# Patient Record
Sex: MALE | Race: WHITE | URBAN - METROPOLITAN AREA
[De-identification: names, ages, dates, MRNs, and addresses within clinical notes are randomized per-mention and may not be internally consistent; named-entity substitution may affect disease eponyms.]

---

## 2018-04-01 ENCOUNTER — TRANSFERRED RECORDS (OUTPATIENT)
Dept: HEALTH INFORMATION MANAGEMENT | Facility: CLINIC | Age: 53
End: 2018-04-01

## 2018-05-02 ENCOUNTER — RADIANT APPOINTMENT (OUTPATIENT)
Dept: GENERAL RADIOLOGY | Facility: CLINIC | Age: 53
End: 2018-05-02
Attending: FAMILY MEDICINE
Payer: COMMERCIAL

## 2018-05-02 ENCOUNTER — OFFICE VISIT (OUTPATIENT)
Dept: FAMILY MEDICINE | Facility: CLINIC | Age: 53
End: 2018-05-02
Payer: COMMERCIAL

## 2018-05-02 VITALS
RESPIRATION RATE: 12 BRPM | DIASTOLIC BLOOD PRESSURE: 74 MMHG | HEART RATE: 71 BPM | SYSTOLIC BLOOD PRESSURE: 117 MMHG | TEMPERATURE: 97.4 F | BODY MASS INDEX: 27.3 KG/M2 | WEIGHT: 201.6 LBS | HEIGHT: 72 IN

## 2018-05-02 DIAGNOSIS — S69.92XA WRIST INJURY, LEFT, INITIAL ENCOUNTER: ICD-10-CM

## 2018-05-02 DIAGNOSIS — S52.502A CLOSED FRACTURE OF DISTAL END OF LEFT RADIUS, UNSPECIFIED FRACTURE MORPHOLOGY, INITIAL ENCOUNTER: Primary | ICD-10-CM

## 2018-05-02 PROCEDURE — 99214 OFFICE O/P EST MOD 30 MIN: CPT | Mod: 25 | Performed by: FAMILY MEDICINE

## 2018-05-02 PROCEDURE — 73110 X-RAY EXAM OF WRIST: CPT | Mod: LT

## 2018-05-02 PROCEDURE — 29125 APPL SHORT ARM SPLINT STATIC: CPT | Performed by: FAMILY MEDICINE

## 2018-05-02 NOTE — PROGRESS NOTES
SUBJECTIVE:   Nikita Troy is a 52 year old male who presents to clinic today for the following health issues:      Joint Pain    Onset: injured left wrist yesterday, fell yesterday after the bench (1 foot high) he was standing on collapsed and caught the fall with both upper extremities but right UE had a wrist brace. Patient said he fell forward like he was doing a push up.    Description:   Location: left wrist  Character: Dull ache    Intensity: 10/10 if he tries to use the hand/wrist    Progression of Symptoms: same    Accompanying Signs & Symptoms:  Other symptoms: radiation of pain to lower arm, numbness and swelling, can't lift due to the pain  Patient denies head trauma or LOC during the incident.    History:   Previous similar pain: no       Precipitating factors:   Trauma or overuse: YES- fell yesterday    Alleviating factors:  Improved by: ice    Therapies Tried and outcome: ice, aleve - no relief to date. Patient denies going to UC or ER at time of incident.     Verified above history with patient.      Problem list and histories reviewed & adjusted, as indicated.  Additional history: as documented    There is no problem list on file for this patient.    History reviewed. No pertinent surgical history.    Social History   Substance Use Topics     Smoking status: Former Smoker     Smokeless tobacco: Former User     Types: Chew     Alcohol use Yes      Comment: few beers weekly     Family History   Problem Relation Age of Onset     CEREBROVASCULAR DISEASE Mother          Current Outpatient Prescriptions   Medication Sig Dispense Refill     METHOTREXATE PO 6 tablet weekly       Naproxen Sodium (ALEVE PO) prn       No Known Allergies    Reviewed and updated as needed this visit by clinical staff  Tobacco  Allergies  Meds  Problems  Med Hx  Surg Hx  Fam Hx  Soc Hx        Reviewed and updated as needed this visit by Provider  Allergies  Meds  Problems         ROS:  C: NEGATIVE for fever, chills,  "change in weight  I: NEGATIVE for worrisome rashes, moles or lesions  MUSCULOSKELETAL:see above  N: NEGATIVE for weakness, dizziness or paresthesias  H: NEGATIVE for bleeding problems    OBJECTIVE:                                                    /74 (BP Location: Right arm, Patient Position: Chair, Cuff Size: Adult Large)  Pulse 71  Temp 97.4  F (36.3  C) (Tympanic)  Resp 12  Ht 5' 11.5\" (1.816 m)  Wt 201 lb 9.6 oz (91.4 kg)  BMI 27.73 kg/m2  Body mass index is 27.73 kg/(m^2).  GENERAL: alert and no distress  Wrist Exam: Left WRIST:  Inspection: swelling moderate; skin: non-ecchymotic, non-erythematous  Palpation: Tender: distal radius  Non-tender: distal ulna, TFCC, ECU, 1st dorsal compartment, extension tendons, flexor tendons, snuff box  Range of Motion: difficultly making fist; limited flexion/extension due to pain and swelling  Strength:  strength decreased.   Wrist strength unable to be tested due to pain  Special tests: radial, ulnar and median nerve function intact    ELBOW:  Ipsilateral elbow with no swelling/TTP      Diagnostic test results:  Diagnostic Test Results:  Results for orders placed or performed in visit on 05/02/18 (from the past 24 hour(s))   XR Wrist Left G/E 3 Views    Narrative    LEFT WRIST THREE OR MORE VIEWS  5/2/2018 7:46 AM     HISTORY: Rule out fracture. Wrist injury, left, initial encounter.      Impression    IMPRESSION: Distal radial fracture with a probable transverse,  hairline, nondisplaced component and a longitudinal component adjacent  to the distal radioulnar joint. Distal radioulnar joint degenerative  arthrosis is noted with ulnar tuberosity osteophytic spurring. The  radiocarpal joint space width appears within normal limits. Mild  widening of the scapholunate joint space width could indicate  age-indeterminate scapholunate ligament injury.    REID YEBOAH MD        ASSESSMENT/PLAN:                                                        ICD-10-CM  "   1. Closed fracture of distal end of left radius, unspecified fracture morphology, initial encounter S52.502A ORTHO  REFERRAL     FIBERGLASS  SPLINT ( ONE STEP)   2. Wrist injury, left, initial encounter S69.92XA XR Wrist Left G/E 3 Views     Patient was advised on xray images. Discussed presence of visible distal radial fracture. Radiology report pending at time of encounter.  RICE tx discussed with patient.  Ortho consult ordered.  Ibuprofen 200 mg 1-2 tablets with food every 8 hrs as needed for pain  Applied fiberglass splint extending to MCP and to middle third of forearm for immobilization.  Patient was advised to keep splint until he sees ortho. Adjust elastic wrap as swelling increases or decreases.  Advised to avoid use of the left hand until healed.  Return precautions discussed and given to patient.      Follow up with Provider - at ortho appt   Patient Instructions     You will be contacted in 1-2 business days to get a schedule for the orthopedic specialist    Keep splint on until that appointment.    Ibuprofen 200 mg 1-2 tablets with food every 8 hrs as needed for pain.        Thank you for choosing [facility].  You may be receiving a survey in the mail from [employer] regarding your visit today.  Please take a few minutes to complete and return the survey to let us know how we are doing.      If you have questions or concerns, please contact us via [employer] or you can contact your care team at [phone].    Our Clinic hours are:  Monday 6:40 am  to 7:00 pm  Tuesday -Friday 6:40 am to 5:00 pm    The [place] outpatient lab hours are:  Monday - Friday 6:10 am to 4:45 pm  Saturdays 7:00 am to 11:00 am  Appointments are required, call [phone]    If you have clinical questions after hours or would like to schedule an appointment,  call the clinic at [phone].    Understanding a Distal Radius Fracture      A fracture is a broken bone. A fracture in the distal radius is a break in  the lower end of the radius. This is the larger bone in the forearm. Because the break occurs near the wrist, it is often called a wrist fracture.    The bone may be cracked, or it may be broken into 2 or more pieces. The pieces of bone may be lined up or they may have moved out of place. Sometimes, the bone may break through the skin. Nearby nerves, tissues, and joints also may be damaged. Depending on the severity of the fracture, healing may take several months or longer.  What causes a distal radius fracture?  This type of fracture is most often caused from a fall on an outstretched hand. It can also be caused from a blow, accident, or sports injury.  Symptoms of a distal radius fracture  Symptoms can include pain, swelling, and bruising. If the bone breaks through the skin, external bleeding can also occur. The wrist may look crooked, deformed, or bent. It may be hard to move or use the arm, wrist, and hand for normal tasks and activities.  Treating a distal radius fracture  Treatment depends on how serious the fracture is. If needed, the bone is put back into place. This may be done with or without surgery. If surgery is needed, the surgeon may use devices such as pins, plates, or screws to hold the bone together. You may need to wear a splint or cast for a month or longer to protect the bone and keep it in place during healing. Other treatments may be also used to help reduce symptoms or regain function. These include:    Cold packs. Putting an ice pack on the injured area may help reduce swelling and pain.    Raising the arm and wrist. Keeping the arm and wrist raised above heart level may help reduce swelling.    Pain medicines. Taking prescription or over-the-counter pain medicines may help reduce pain and swelling.    Exercises. Doing certain exercises at home or with a physical therapist can help restore strength, flexibility, and range of motion in your arm, wrist, and hand. In general, exercises are  not started until after the splint or cast is removed.  Possible complications of a distal radius fracture  These can include:    Poor healing of the bone    Weakness, stiffness, or loss of range of motion in the arm, wrist, or hand    Osteoarthritis in the wrist joint  When to call your healthcare provider  Call your healthcare provider right away if you have any of these:    Fever of 100.4 F (38 C) or higher, or as directed    Symptoms that don t get better with treatment, or get worse    Numbness, coldness, or swelling in your arm, hand, or fingers    Fingernails that turn blue or gray in color    A splint or cast that is damaged or feels too tight or loose    New symptoms   Date Last Reviewed: 3/10/2016    3797-0172 [employer]. [address] [place] [address], [place] [address]. All rights reserved. This information is not intended as a substitute for professional medical care. Always follow your healthcare professional's instructions.            Edy Walton MD  [facility]

## 2018-05-02 NOTE — PROGRESS NOTES
Please inform patient that the radiologist verified the observed fracture on the xray image and there is another questionable fracture around the same area of the fracture that was showed to him.  There is also possible injury to the ligament that holds two wrist bones (the scaphoid and lunate).  Continue splint as applied today.  Patient to see ortho as planned - further management will be determined by orthopedist.

## 2018-05-02 NOTE — PATIENT INSTRUCTIONS
You will be contacted in 1-2 business days to get a schedule for the orthopedic specialist    Keep splint on until that appointment.    Ibuprofen 200 mg 1-2 tablets with food every 8 hrs as needed for pain.        Thank you for choosing [facility].  You may be receiving a survey in the mail from [employer] regarding your visit today.  Please take a few minutes to complete and return the survey to let us know how we are doing.      If you have questions or concerns, please contact us via [employer] or you can contact your care team at [phone].    Our Clinic hours are:  Monday 6:40 am  to 7:00 pm  Tuesday -Friday 6:40 am to 5:00 pm    The [place] outpatient lab hours are:  Monday - Friday 6:10 am to 4:45 pm  Saturdays 7:00 am to 11:00 am  Appointments are required, call [phone]    If you have clinical questions after hours or would like to schedule an appointment,  call the clinic at [phone].    Understanding a Distal Radius Fracture      A fracture is a broken bone. A fracture in the distal radius is a break in the lower end of the radius. This is the larger bone in the forearm. Because the break occurs near the wrist, it is often called a wrist fracture.    The bone may be cracked, or it may be broken into 2 or more pieces. The pieces of bone may be lined up or they may have moved out of place. Sometimes, the bone may break through the skin. Nearby nerves, tissues, and joints also may be damaged. Depending on the severity of the fracture, healing may take several months or longer.  What causes a distal radius fracture?  This type of fracture is most often caused from a fall on an outstretched hand. It can also be caused from a blow, accident, or sports injury.  Symptoms of a distal radius fracture  Symptoms can include pain, swelling, and bruising. If the bone breaks through the skin, external bleeding can also occur. The wrist may look crooked, deformed, or bent. It may be hard to move or use the  arm, wrist, and hand for normal tasks and activities.  Treating a distal radius fracture  Treatment depends on how serious the fracture is. If needed, the bone is put back into place. This may be done with or without surgery. If surgery is needed, the surgeon may use devices such as pins, plates, or screws to hold the bone together. You may need to wear a splint or cast for a month or longer to protect the bone and keep it in place during healing. Other treatments may be also used to help reduce symptoms or regain function. These include:    Cold packs. Putting an ice pack on the injured area may help reduce swelling and pain.    Raising the arm and wrist. Keeping the arm and wrist raised above heart level may help reduce swelling.    Pain medicines. Taking prescription or over-the-counter pain medicines may help reduce pain and swelling.    Exercises. Doing certain exercises at home or with a physical therapist can help restore strength, flexibility, and range of motion in your arm, wrist, and hand. In general, exercises are not started until after the splint or cast is removed.  Possible complications of a distal radius fracture  These can include:    Poor healing of the bone    Weakness, stiffness, or loss of range of motion in the arm, wrist, or hand    Osteoarthritis in the wrist joint  When to call your healthcare provider  Call your healthcare provider right away if you have any of these:    Fever of 100.4 F (38 C) or higher, or as directed    Symptoms that don t get better with treatment, or get worse    Numbness, coldness, or swelling in your arm, hand, or fingers    Fingernails that turn blue or gray in color    A splint or cast that is damaged or feels too tight or loose    New symptoms   Date Last Reviewed: 3/10/2016    9833-6927 [employer]. [address] [place] [address]. All rights reserved. This information is not intended as a substitute for professional medical care. Always  follow your healthcare professional's instructions.

## 2018-05-02 NOTE — Clinical Note
Please abstract the following data from this visit with this patient into the appropriate field in Epic:  Colonoscopy done on this date: 4/2018 (approximately), by this group: Courtney Brown, results were normal, repeat in 5 years.

## 2018-05-02 NOTE — NURSING NOTE
"Chief Complaint   Patient presents with     Trauma     Wrist       Initial /74 (BP Location: Right arm, Patient Position: Chair, Cuff Size: Adult Large)  Pulse 71  Temp 97.4  F (36.3  C) (Tympanic)  Resp 12  Ht 5' 11.5\" (1.816 m)  Wt 201 lb 9.6 oz (91.4 kg)  BMI 27.73 kg/m2 Estimated body mass index is 27.73 kg/(m^2) as calculated from the following:    Height as of this encounter: 5' 11.5\" (1.816 m).    Weight as of this encounter: 201 lb 9.6 oz (91.4 kg).  Medication Reconciliation: complete  "

## 2018-05-02 NOTE — MR AVS SNAPSHOT
After Visit Summary   5/2/2018    Nikita Troy    MRN: [id number]           Patient Information     Date Of Birth          1965        Visit Information        Provider Department      5/2/2018 7:00 AM Edy Walton MD [facility]        Today's Diagnoses     Closed fracture of distal end of left radius, unspecified fracture morphology, initial encounter    -  1    Wrist injury, left, initial encounter          Care Instructions    You will be contacted in 1-2 business days to get a schedule for the orthopedic specialist    Keep splint on until that appointment.    Ibuprofen 200 mg 1-2 tablets with food every 8 hrs as needed for pain.        Thank you for choosing [facility].  You may be receiving a survey in the mail from [employer] regarding your visit today.  Please take a few minutes to complete and return the survey to let us know how we are doing.      If you have questions or concerns, please contact us via [employer] or you can contact your care team at [phone].    Our Clinic hours are:  Monday 6:40 am  to 7:00 pm  Tuesday -Friday 6:40 am to 5:00 pm    The [place] outpatient lab hours are:  Monday - Friday 6:10 am to 4:45 pm  Saturdays 7:00 am to 11:00 am  Appointments are required, call [phone]    If you have clinical questions after hours or would like to schedule an appointment,  call the clinic at [phone].    Understanding a Distal Radius Fracture      A fracture is a broken bone. A fracture in the distal radius is a break in the lower end of the radius. This is the larger bone in the forearm. Because the break occurs near the wrist, it is often called a wrist fracture.    The bone may be cracked, or it may be broken into 2 or more pieces. The pieces of bone may be lined up or they may have moved out of place. Sometimes, the bone may break through the skin. Nearby nerves, tissues, and joints also may be damaged. Depending on the severity of  the fracture, healing may take several months or longer.  What causes a distal radius fracture?  This type of fracture is most often caused from a fall on an outstretched hand. It can also be caused from a blow, accident, or sports injury.  Symptoms of a distal radius fracture  Symptoms can include pain, swelling, and bruising. If the bone breaks through the skin, external bleeding can also occur. The wrist may look crooked, deformed, or bent. It may be hard to move or use the arm, wrist, and hand for normal tasks and activities.  Treating a distal radius fracture  Treatment depends on how serious the fracture is. If needed, the bone is put back into place. This may be done with or without surgery. If surgery is needed, the surgeon may use devices such as pins, plates, or screws to hold the bone together. You may need to wear a splint or cast for a month or longer to protect the bone and keep it in place during healing. Other treatments may be also used to help reduce symptoms or regain function. These include:    Cold packs. Putting an ice pack on the injured area may help reduce swelling and pain.    Raising the arm and wrist. Keeping the arm and wrist raised above heart level may help reduce swelling.    Pain medicines. Taking prescription or over-the-counter pain medicines may help reduce pain and swelling.    Exercises. Doing certain exercises at home or with a physical therapist can help restore strength, flexibility, and range of motion in your arm, wrist, and hand. In general, exercises are not started until after the splint or cast is removed.  Possible complications of a distal radius fracture  These can include:    Poor healing of the bone    Weakness, stiffness, or loss of range of motion in the arm, wrist, or hand    Osteoarthritis in the wrist joint  When to call your healthcare provider  Call your healthcare provider right away if you have any of these:    Fever of 100.4 F (38 C) or higher, or as  directed    Symptoms that don t get better with treatment, or get worse    Numbness, coldness, or swelling in your arm, hand, or fingers    Fingernails that turn blue or gray in color    A splint or cast that is damaged or feels too tight or loose    New symptoms   Date Last Reviewed: 3/10/2016    3351-6936 [employer]. [address] [place] [address]. All rights reserved. This information is not intended as a substitute for professional medical care. Always follow your healthcare professional's instructions.                Follow-ups after your visit        Additional Services     ORTHO  REFERRAL       [facility] is referring you to the Orthopedic  Services at [facility].       The  Representative will assist you in the coordination of your Orthopedic and Musculoskeletal Care as prescribed by your physician.    The  Representative will call you within 1 business day to help schedule your appointment, or you may contact the  Representative at:    All areas ~ [phone]     Type of Referral : Surgical / Specialist - Hand      Timeframe requested: 1 - 2 days    Coverage of these services is subject to the terms and limitations of your health insurance plan.  Please call member services at your health plan with any benefit or coverage questions.      If X-rays, CT or MRI's have been performed, please contact the facility where they were done to arrange for , prior to your scheduled appointment.  Please bring this referral request to your appointment and present it to your specialist.                  Who to contact     If you have questions or need follow up information about today's clinic visit or your schedule please contact [facility] directly at [phone].  Normal or non-critical lab and imaging results will be communicated to you by MyChart, letter or phone within 4 business days  "after the clinic has received the results. If you do not hear from us within 7 days, please contact the clinic through [employer] or phone. If you have a critical or abnormal lab result, we will notify you by phone as soon as possible.  Submit refill requests through [employer] or call your pharmacy and they will forward the refill request to us. Please allow 3 business days for your refill to be completed.          Additional Information About Your Visit        [employer] Information     [employer] lets you send messages to your doctor, view your test results, renew your prescriptions, schedule appointments and more. To sign up, go to [email or web address] . Click on \"Log in\" on the left side of the screen, which will take you to the Welcome page. Then click on \"Sign up Now\" on the right side of the page.     You will be asked to enter the access code listed below, as well as some personal information. Please follow the directions to create your username and password.     Your access code is: [email or web address]  Expires: 2018  8:10 AM     Your access code will  in 90 days. If you need help or a new code, please call your [place] clinic or [phone].        Care EveryWhere ID     This is your Care EveryWhere ID. This could be used by other organizations to access your [place] medical records  [id number]        Your Vitals Were     Pulse Temperature Respirations Height BMI (Body Mass Index)       71 97.4  F (36.3  C) (Tympanic) 12 5' 11.5\" (1.816 m) 27.73 kg/m2        Blood Pressure from Last 3 Encounters:   18 117/74   08 124/86    Weight from Last 3 Encounters:   18 201 lb 9.6 oz (91.4 kg)   08 202 lb (91.6 kg)              We Performed the Following     ORTHO  REFERRAL     XR Wrist Left G/E 3 Views        Primary Care Provider Fax #    Physician No Ref-Primary [phone]       No address on file        Equal Access to Services     JOSSE LOMBARDO AH: Marshall Diaz, " amanda mckeon, qaybta kaduane kristentony, patel eliezerin hayaan kristenluisana marilujessi laLizamychal ah. So [facility] [phone].    ATENCIÓN: Si habla lopez, tiene a jenkins disposición servicios gratuitos de asistencia lingüística. Llame al [phone].    We comply with applicable federal civil rights laws and [place] laws. We do not discriminate on the basis of race, color, national origin, age, disability, sex, sexual orientation, or gender identity.            Thank you!     Thank you for choosing [facility]  for your care. Our goal is always to provide you with excellent care. Hearing back from our patients is one way we can continue to improve our services. Please take a few minutes to complete the written survey that you may receive in the mail after your visit with us. Thank you!             Your Updated Medication List - Protect others around you: Learn how to safely use, store and throw away your medicines at www.disposemymeds.org.          This list is accurate as of 5/2/18  8:10 AM.  Always use your most recent med list.                   Brand Name Dispense Instructions for use Diagnosis    ALEVE PO      prn        METHOTREXATE PO      6 tablet weekly

## 2023-02-09 ENCOUNTER — LAB REQUISITION (OUTPATIENT)
Dept: LAB | Facility: CLINIC | Age: 58
End: 2023-02-09
Payer: COMMERCIAL

## 2023-02-09 DIAGNOSIS — R22.32 LOCALIZED SWELLING, MASS AND LUMP, LEFT UPPER LIMB: ICD-10-CM

## 2023-02-09 PROCEDURE — 88304 TISSUE EXAM BY PATHOLOGIST: CPT | Mod: 26 | Performed by: PATHOLOGY

## 2023-02-09 PROCEDURE — 88305 TISSUE EXAM BY PATHOLOGIST: CPT | Mod: TC,ORL | Performed by: ORTHOPAEDIC SURGERY

## 2023-02-14 LAB
PATH REPORT.COMMENTS IMP SPEC: NORMAL
PATH REPORT.COMMENTS IMP SPEC: NORMAL
PATH REPORT.FINAL DX SPEC: NORMAL
PATH REPORT.GROSS SPEC: NORMAL
PATH REPORT.MICROSCOPIC SPEC OTHER STN: NORMAL
PATH REPORT.RELEVANT HX SPEC: NORMAL
PHOTO IMAGE: NORMAL